# Patient Record
Sex: FEMALE
[De-identification: names, ages, dates, MRNs, and addresses within clinical notes are randomized per-mention and may not be internally consistent; named-entity substitution may affect disease eponyms.]

---

## 2020-12-29 ENCOUNTER — NURSE TRIAGE (OUTPATIENT)
Dept: OTHER | Facility: CLINIC | Age: 55
End: 2020-12-29

## 2020-12-30 NOTE — TELEPHONE ENCOUNTER
Reason for Disposition   [1] CLOSE CONTACT COVID-19 EXPOSURE within last 14 days AND [2] NO symptoms    Answer Assessment - Initial Assessment Questions  1. COVID-19 CLOSE CONTACT: \"Who is the person with the confirmed or suspected COVID-19 infection that you were exposed to?\"     Person was not tested, suspected    2. PLACE of CONTACT: \"Where were you when you were exposed to COVID-19? \" (e.g., home, school, medical waiting room; which city?)   in a shared home    3. TYPE of CONTACT: \"How much contact was there? \" (e.g., sitting next to, live in same house, work in same office, same building)    Same house and passed each other in the same room    4. DURATION of CONTACT: \"How long were you in contact with the COVID-19 patient? \" (e.g., a few seconds, passed by person, a few minutes, 15 minutes or longer, live with the patient)  Less than an hour total    5. MASK: \"Were you wearing a mask? \" \"Was the other person wearing a mask? \" Note: wearing a mask reduces the risk of an   otherwise close contact. No masks    6. DATE of CONTACT: \"When did you have contact with a COVID-19 patient? \" (e.g., how many days ago)  Today    7. COMMUNITY SPREAD: \"Are there lots of cases of COVID-19 (community spread) where you live? \" (See public health department website, if unsure)    Yes    8. SYMPTOMS: \"Do you have any symptoms? \" (e.g., fever, cough, breathing difficulty, loss of taste or smell)    No    9. PREGNANCY OR POSTPARTUM: \"Is there any chance you are pregnant? \" \"When was your last menstrual period? \" \"Did you deliver in the last 2 weeks? \"   n/a    10. HIGH RISK: \"Do you have any heart or lung problems? Do you have a weak immune system? \" (e.g., heart failure, COPD, asthma, HIV positive, chemotherapy, renal failure, diabetes mellitus, sickle cell anemia, obesity)      No    11. TRAVEL: \"Have you traveled out of the country recently? \" If so, \"When and where? \"  Also ask about out-of-state travel, since the CDC has identified some high-risk cities for community spread in the [address] [place] [address] [address]. Note: Travel becomes less relevant if there is widespread community transmission where the patient lives. No    Protocols used: CORONAVIRUS (COVID-19) EXPOSURE-ADULT-AH    Caller provided care advice and instructed to call back with worsening symptoms.

## 2024-11-11 ENCOUNTER — APPOINTMENT (OUTPATIENT)
Dept: DERMATOLOGY | Facility: CLINIC | Age: 59
End: 2024-11-11
Payer: COMMERCIAL

## 2024-11-11 VITALS — DIASTOLIC BLOOD PRESSURE: 82 MMHG | HEART RATE: 87 BPM | SYSTOLIC BLOOD PRESSURE: 134 MMHG

## 2024-11-11 DIAGNOSIS — C44.41 BASAL CELL CARCINOMA (BCC) OF SCALP: ICD-10-CM

## 2024-11-11 PROCEDURE — 17311 MOHS 1 STAGE H/N/HF/G: CPT | Performed by: STUDENT IN AN ORGANIZED HEALTH CARE EDUCATION/TRAINING PROGRAM

## 2024-11-11 PROCEDURE — 17312 MOHS ADDL STAGE: CPT | Performed by: STUDENT IN AN ORGANIZED HEALTH CARE EDUCATION/TRAINING PROGRAM

## 2024-11-11 RX ORDER — KETOCONAZOLE 20 MG/G
CREAM TOPICAL
COMMUNITY
Start: 2024-10-30

## 2024-11-11 NOTE — PROGRESS NOTES
Mohs Surgery Operative Note    Date of Surgery:  11/11/2024  Surgeon:  Josue Cole MD  Office Location: [address]  [facility] [address]  Dept: [phone]  Dept Fax: [phone]  Referring Provider: Destinee Sepulveda MD  [address] [place] [address] [address]  [place] [address]      Assessment/Plan   Pre-procedure:   Obtained informed consent: written from patient  The surgical site was identified with two staff members (MD and nurse) and confirmed with the patient using photographs and palpation.     Intra-operative:   Audible time out called at : 9:28 AM 11/11/24  by: Mattie Griffiths RN   Verified patient name, birthdate, site, specimen bottle label & requisition.    The planned procedure(s) was again reviewed with the patient. The risks of bleeding, infection, nerve damage and scarring were reviewed. Written authorization was obtained. The patient identity, surgical site, and planned procedure(s) were verified. The provider acted as both surgeon and pathologist.     Basal cell carcinoma (BCC) of scalp  Right Central Frontal Scalp    Mohs surgery    Consent obtained: written    Universal Protocol:  Procedure explained and questions answered to patient or proxy's satisfaction: Yes    Test results available and properly labeled: Yes    Pathology report reviewed: Yes    External notes reviewed: Yes    Photo or diagram used for site identification: Yes    Site/side marked: Yes    Slide independently reviewed by Mohs surgeon: Yes    Immediately prior to procedure a time out was called: Yes    Patient identity confirmed: verbally with patient  Preparation: Patient was prepped and draped in usual sterile fashion      Anticoagulation:  Is the patient taking prescription anticoagulant and/or aspirin prescribed/recommended by a physician? No    Was the anticoagulation regimen changed prior to Mohs? No      Anesthesia:  Anesthesia method: local infiltration  Local  anesthetic: lidocaine 1% WITH epi    Procedure Details:  Case ID Number: [id number]  Biopsy accession number: [id number] (OUTSIDE PATH)  Date of biopsy: 10/30/2024  Pre-Op diagnosis: basal cell carcinoma  BCC subtype: nodular  Surgical site (from skin exam): Right Central Frontal Scalp  Pre-operative length (cm): 0.9  Pre-operative width (cm): 0.7  Indications for Mohs surgery: anatomic location where tissue conservation is critical    Micrographic Surgery Details:  Post-operative length (cm): 1  Post-operative width (cm): 0.9  Number of Mohs stages: 2    Stage 1     Comments: The patient was brought into the operating room and placed in the procedure chair in the appropriate position.  The area positive by previous biopsy was identified and confirmed with the patient. The area of clinically obvious tumor was debulked using a curette and/or scalpel as needed. An incision was made following the Mohs approach through the skin. The specimen was taken to the lab, divided into 2 piece(s) and appropriately chromacoded and processed.    Tumor was seen on the lateral margins as indicated on the on the Mohs map.  Nodular basal cell carcinoma. Histologic examination revealed large tumor aggregates of atypical basaloid cells with peripheral palisading and tumoral clefting.        Tumor features identified on Mohs section: basal carcinoma      Depth of invasion: dermis    Stage 2     Comments: The area of positivity as noted on the Mohs map in the previous stage was identified and removed using the Mohs technique. The specimen was taken to the lab and appropriately chromacoded and processed in 1 piece(s).     Tumor features identified on Mohs section: no tumor identified    Depth of defect: subcutaneous fat    Patient tolerance of procedure: tolerated well, no immediate complications    Reconstruction:  Was the defect reconstructed?: No     Repair: After a discussion with the patient regarding the options for wound closure, a  decision was made to proceed with second intention healing.    Dressing/Follow-up: Surgifoam was placed in the wound. A pressure dressing was placed to help stabilize the wound and to minimize the risk of postoperative bleeding. Wound care was discussed, and the patient was given written post-operative wound care instructions.    Outcome: patient tolerated procedure well with no complications    Post-procedure details: sterile dressing applied and wound care instructions given    Dressing type: pressure dressing, Gelfoam and gauze roll      Staff Communication: Dermatology Local Anesthesia: 1 % Lidocaine / Epinephrine - Amount: 2.5cc              The patient will follow up with Josue Cole MD as needed for any post operative problems or concerns, and will follow up with their primary dermatologist as scheduled.

## 2024-11-11 NOTE — PROGRESS NOTES
"Office Visit Note  Date: 11/11/2024  Surgeon:  oJsue Cole MD  Office Location:  [address]  [facility] [address]  Dept: [phone]  Dept Fax: [phone]  Referring Provider: Destinee Sepulveda MD  [address] [place] [address] [address]  [place] [address]    Subjective   Hayley Parker \"Lorelei\" is a 59 y.o. female who presents for the following: MOHS Surgery    According to the patient, the lesion has been present for approximately 1 month at the time of diagnosis.  The lesion is not causing symptoms.  The lesion has not been treated previously.    The patient does not have a pacemaker / defibrillator.  The patient does not have a heart valve / joint replacement.    The patient is not on blood thinners.  The patient does not have a history of hepatitis B or C.  The patient does not have a history of HIV.  The patient does not have a history of immunosuppression (e.g. organ transplantation, malignancy, medications)    Review of Systems:  No other skin or systemic complaints other than what is documented elsewhere in the note.    MEDICAL HISTORY: clinically relevant history including significant past medical history, medications and allergies was reviewed and documented in Epic.    Objective   Well appearing patient in no apparent distress; mood and affect are within normal limits.  Vital signs: See record.  Noted on the Right Central Frontal Scalp  Is a 0.9 x 0.7 cm scar        The patient confirmed the identified site.    Discussion:  The nature of the diagnosis was explained. The lesion is a skin cancer.  It has a risk of local growth and distant spread. The condition is associated with sun exposure.  Warning signs of non-melanoma skin cancer discussed. Patient was instructed to perform monthly self skin examination.  We recommended that the patient have regular full skin exams given an increased risk of subsequent skin cancers. The patient was instructed to use " sun protective behaviors including use of broad spectrum sunscreens and sun protective clothing to reduce risk of skin cancers.      Risks, benefits, side effects of Mohs surgery were discussed with patient and the patient voiced understanding.  It was explained that even though the cure rate of Mohs is very high it is not 100%. Risks of surgery including but not limited to bleeding, infection, numbness, nerve damage, and scar were reviewed.  Discussion included wound care requirements, activity restrictions, likely scar outcome and time to heal.     After Mohs surgery, the defect may need to be repaired surgically and the scar may be longer than the original lesion.  Reconstruction options, risks, and benefits were reviewed including second intention healing, linear repair (4-1 ratio was explained), local flaps, skin grafts, cartilage grafts and interpolation flaps (the need for multiple surgeries was explained). Possible outcomes were reviewed including likely scar appearance, failure of flap survival, infection, bleeding and the need for revision surgery.     The pathology was reviewed, the photograph was reviewed, and the referring physician's note was reviewed.    Patient elected for Mohs surgery.